# Patient Record
Sex: FEMALE | Race: WHITE
[De-identification: names, ages, dates, MRNs, and addresses within clinical notes are randomized per-mention and may not be internally consistent; named-entity substitution may affect disease eponyms.]

---

## 2017-02-21 ENCOUNTER — HOSPITAL ENCOUNTER (OUTPATIENT)
Age: 72
Discharge: HOME | End: 2017-02-21
Payer: MEDICARE

## 2017-02-21 DIAGNOSIS — I10: Primary | ICD-10-CM

## 2017-02-21 DIAGNOSIS — E78.5: ICD-10-CM

## 2017-02-21 DIAGNOSIS — E11.9: ICD-10-CM

## 2017-03-22 ENCOUNTER — HOSPITAL ENCOUNTER (OUTPATIENT)
Age: 72
Discharge: HOME | End: 2017-03-22
Payer: MEDICARE

## 2017-03-22 DIAGNOSIS — Z12.31: Primary | ICD-10-CM

## 2017-07-21 ENCOUNTER — HOSPITAL ENCOUNTER (OUTPATIENT)
Dept: HOSPITAL 76 - LAB.WCP | Age: 72
Discharge: HOME | End: 2017-07-21
Attending: FAMILY MEDICINE
Payer: MEDICARE

## 2017-07-21 DIAGNOSIS — E11.9: Primary | ICD-10-CM

## 2017-07-21 DIAGNOSIS — I10: ICD-10-CM

## 2017-07-21 LAB
ANION GAP SERPL CALCULATED.4IONS-SCNC: 7 MMOL/L (ref 6–13)
BUN SERPL-MCNC: 16 MG/DL (ref 6–20)
CALCIUM UR-MCNC: 9.5 MG/DL (ref 8.5–10.3)
CHLORIDE SERPL-SCNC: 97 MMOL/L (ref 101–111)
CO2 SERPL-SCNC: 31 MMOL/L (ref 21–32)
CREAT SERPLBLD-SCNC: 0.8 MG/DL (ref 0.4–1)
EST. AVERAGE GLUCOSE BLD GHB EST-MCNC: 148 MG/DL (ref 70–100)
GFRSERPLBLD MDRD-ARVRAT: 71 ML/MIN/{1.73_M2} (ref 89–?)
GLUCOSE SERPL-MCNC: 118 MG/DL (ref 70–100)
HBA1C BLD-MCNC: 0.71 G/DL
POTASSIUM SERPL-SCNC: 3.8 MMOL/L (ref 3.5–5)
SODIUM SERPLBLD-SCNC: 135 MMOL/L (ref 135–145)

## 2017-07-21 PROCEDURE — 80048 BASIC METABOLIC PNL TOTAL CA: CPT

## 2017-07-21 PROCEDURE — 36415 COLL VENOUS BLD VENIPUNCTURE: CPT

## 2017-07-21 PROCEDURE — 83036 HEMOGLOBIN GLYCOSYLATED A1C: CPT

## 2018-03-29 ENCOUNTER — HOSPITAL ENCOUNTER (OUTPATIENT)
Dept: HOSPITAL 76 - LAB.WCP | Age: 73
Discharge: HOME | End: 2018-03-29
Attending: FAMILY MEDICINE
Payer: MEDICARE

## 2018-03-29 DIAGNOSIS — E11.9: Primary | ICD-10-CM

## 2018-03-29 LAB
EST. AVERAGE GLUCOSE BLD GHB EST-MCNC: 177 MG/DL (ref 70–100)
HB2 TOTAL: 13.2 G/DL
HBA1C BLD-MCNC: 0.82 G/DL
HEMOGLOBIN A1C %: 7.8 % (ref 4.6–6.2)

## 2018-03-29 PROCEDURE — 83036 HEMOGLOBIN GLYCOSYLATED A1C: CPT

## 2018-03-29 PROCEDURE — 36415 COLL VENOUS BLD VENIPUNCTURE: CPT

## 2018-04-19 ENCOUNTER — HOSPITAL ENCOUNTER (OUTPATIENT)
Dept: HOSPITAL 76 - DI.N | Age: 73
Discharge: HOME | End: 2018-04-19
Attending: FAMILY MEDICINE
Payer: MEDICARE

## 2018-04-19 DIAGNOSIS — Z12.31: Primary | ICD-10-CM

## 2018-04-19 PROCEDURE — 77067 SCR MAMMO BI INCL CAD: CPT

## 2018-04-20 NOTE — MAMMOGRAPHY REPORT
SCREENING MAMMOGRAM:  04/20/2018

 

CLINICAL INDICATION:  A 72-year-old for screening.

 

COMPARISON:  3/2017, 2/2016, 1/2015, 12/2013, 8/2012, 7/2011, 6/2010

 

TECHNIQUE:  Routine CC and MLO projections were obtained of the breasts.

 

FINDINGS:  The breasts again demonstrate scattered fibroglandular densities bilaterally.  

Coarse and punctate, typically benign calcifications are present, no suspicious masses, 

clustered microcalcifications, or regions of architectural distortion are identified.

 

IMPRESSION:  BENIGN FINDINGS.

 

RECOMMENDATION:  Routine annual screening unless otherwise clinically indicated.

 

BIRADS CATEGORY - 2 BENIGN FINDINGS.

 

STANDARD QUALIFYING STATEMENTS:

 

1.  This examination was reviewed with the aid of Computer-Aided Detection (CAD).

 

2.  A negative or benign imaging report should not delay biopsy if clinically suspicious 

findings are present.  Consider surgical consultation if warranted.  More than 5% of cancers 

are not identified by imaging.

 

3.  Dense breasts may obscure an underlying neoplasm.

 

 

 

DD: 04/20/2018 13:49

TD: 04/20/2018 20:52

Job #: [id number]

## 2018-06-28 ENCOUNTER — HOSPITAL ENCOUNTER (OUTPATIENT)
Dept: HOSPITAL 76 - LAB.WCP | Age: 73
Discharge: HOME | End: 2018-06-28
Attending: FAMILY MEDICINE
Payer: MEDICARE

## 2018-06-28 DIAGNOSIS — Z79.4: Primary | ICD-10-CM

## 2018-06-28 DIAGNOSIS — E78.5: ICD-10-CM

## 2018-06-28 DIAGNOSIS — I10: ICD-10-CM

## 2018-06-28 DIAGNOSIS — E11.9: ICD-10-CM

## 2018-06-28 DIAGNOSIS — R74.8: ICD-10-CM

## 2018-06-28 LAB
ALBUMIN DIAFP-MCNC: 4.1 G/DL (ref 3.2–5.5)
ALBUMIN/GLOB SERPL: 1.5 {RATIO} (ref 1–2.2)
ALP SERPL-CCNC: 71 IU/L (ref 42–121)
ALT SERPL W P-5'-P-CCNC: 28 IU/L (ref 10–60)
ANION GAP SERPL CALCULATED.4IONS-SCNC: 8 MMOL/L (ref 6–13)
AST SERPL W P-5'-P-CCNC: 25 IU/L (ref 10–42)
BILIRUB BLD-MCNC: 0.8 MG/DL (ref 0.2–1)
BUN SERPL-MCNC: 14 MG/DL (ref 6–20)
CALCIUM UR-MCNC: 9.5 MG/DL (ref 8.5–10.3)
CHLORIDE SERPL-SCNC: 98 MMOL/L (ref 101–111)
CHOLEST SERPL-MCNC: 173 MG/DL
CO2 SERPL-SCNC: 31 MMOL/L (ref 21–32)
CREAT SERPLBLD-SCNC: 0.8 MG/DL (ref 0.4–1)
EST. AVERAGE GLUCOSE BLD GHB EST-MCNC: 163 MG/DL (ref 70–100)
GFRSERPLBLD MDRD-ARVRAT: 71 ML/MIN/{1.73_M2} (ref 89–?)
GLOBULIN SER-MCNC: 2.7 G/DL (ref 2.1–4.2)
GLUCOSE SERPL-MCNC: 86 MG/DL (ref 70–100)
HB2 TOTAL: 13.8 G/DL
HBA1C BLD-MCNC: 0.78 G/DL
HDLC SERPL-MCNC: 72 MG/DL
HDLC SERPL: 2.4 {RATIO} (ref ?–4.4)
HEMOGLOBIN A1C %: 7.3 % (ref 4.6–6.2)
LDLC SERPL CALC-MCNC: 81 MG/DL
LDLC/HDLC SERPL: 1.1 {RATIO} (ref ?–4.4)
PROT SPEC-MCNC: 6.8 G/DL (ref 6.7–8.2)
SODIUM SERPLBLD-SCNC: 137 MMOL/L (ref 135–145)
VLDLC SERPL-SCNC: 20 MG/DL

## 2018-06-28 PROCEDURE — 82043 UR ALBUMIN QUANTITATIVE: CPT

## 2018-06-28 PROCEDURE — 83036 HEMOGLOBIN GLYCOSYLATED A1C: CPT

## 2018-06-28 PROCEDURE — 80053 COMPREHEN METABOLIC PANEL: CPT

## 2018-06-28 PROCEDURE — 80061 LIPID PANEL: CPT

## 2018-06-28 PROCEDURE — 36415 COLL VENOUS BLD VENIPUNCTURE: CPT

## 2018-06-28 PROCEDURE — 83721 ASSAY OF BLOOD LIPOPROTEIN: CPT

## 2019-03-21 ENCOUNTER — HOSPITAL ENCOUNTER (EMERGENCY)
Dept: HOSPITAL 76 - ED | Age: 74
Discharge: HOME | End: 2019-03-21
Payer: MEDICARE

## 2019-03-21 VITALS — SYSTOLIC BLOOD PRESSURE: 120 MMHG | DIASTOLIC BLOOD PRESSURE: 86 MMHG

## 2019-03-21 DIAGNOSIS — E11.9: ICD-10-CM

## 2019-03-21 DIAGNOSIS — L27.0: Primary | ICD-10-CM

## 2019-03-21 DIAGNOSIS — T49.0X5A: ICD-10-CM

## 2019-03-21 PROCEDURE — 99283 EMERGENCY DEPT VISIT LOW MDM: CPT

## 2019-03-21 NOTE — ED PHYSICIAN DOCUMENTATION
PD HPI SKIN





- Stated complaint


Stated Complaint: RASH





- Chief complaint


Chief Complaint: Wound





- History obtained from


History obtained from: Patient





- History of Present Illness


Timing - onset: How many days ago (3)


Timing - duration: Days (3)


Timing - details: Gradual onset, Still present


Location: Bodywide


Quality / character: Itchy, Discolored


Associated symptoms: Facial swelling.  No: Fever, Myalgias, Joint pain, 

Headache, Dyspnea, Abd pain, N/V/D, Urinary sx


Contributing factors: Exposed to medication


Similar symptoms before: Has not had sx before


Recently seen: Not recently seen





- Additional information


Additional information: 





Previously well 73-year-old diabetic female has developed overall body rash that

is urticarial and she believes this may be due to her use of terbinafine.  She 

is taking this medication for toenail fungus.  She has not had a reaction to 

medications previously with the exception of some prednisone causing an elevated

heart rate and blood sugar when she was using it long-term in her eyes after a 

rejected corneal transplant.  She denies any difficulty breathing and denies any

difficulty with swelling in her throat.





She did stop the terbinafine 2 days ago and she has been taking Allegra and 

Benadryl.





Review of Systems


Constitutional: denies: Fever


Eyes: denies: Decreased vision


Ears: denies: Ear pain


Nose: denies: Congestion


Throat: denies: Sore throat


Cardiac: denies: Chest pain / pressure, Palpitations


Respiratory: denies: Dyspnea, Cough


GI: denies: Abdominal Pain, Nausea, Vomiting


: denies: Dysuria, Frequency


Skin: reports: Rash


Musculoskeletal: denies: Neck pain, Back pain, Extremity pain


Neurologic: denies: Generalized weakness, Focal weakness, Numbness





PD PAST MEDICAL HISTORY





- Past Medical History


Past Medical History: Yes


Endocrine/Autoimmune: Type 2 diabetes


GI: Diverticulitis





- Past Surgical History


Past Surgical History: Yes





- Allergies


Allergies/Adverse Reactions: 


                                    Allergies











Allergy/AdvReac Type Severity Reaction Status Date / Time


 


prednisone Allergy  Anaphylaxis Verified 03/21/19 15:36














- Social History


Does the pt smoke?: No


Smoking Status: Never smoker


Does the pt drink ETOH?: No


Does the pt have substance abuse?: No





- Immunizations


Immunizations are current?: Yes





PD ED PE NORMAL





- Vitals


Vital signs reviewed: Yes (hypertensive mild )





- General


General: Alert and oriented X 3, No acute distress, Well developed/nourished





- HEENT


HEENT: Atraumatic, PERRL, EOMI





- Neck


Neck: Supple, no meningeal sign, No bony TTP





- Cardiac


Cardiac: RRR, No murmur





- Respiratory


Respiratory: No respiratory distress, Clear bilaterally





- Abdomen


Abdomen: Soft, Non tender





- Back


Back: No CVA TTP, No spinal TTP





- Derm


Derm: Normal color, Warm and dry, Other (There is erythema consistent with 

urticaria over the chest anteriorly, the rest of the body is similarly affected 

and there is swelling to periorbital tissues. )





- Extremities


Extremities: No deformity





- Neuro


Neuro: Alert and oriented X 3, CNs 2-12 intact, No motor deficit, No sensory 

deficit, Normal speech


Eye Opening: Spontaneous


Motor: Obeys Commands


Verbal: Oriented


GCS Score: 15





- Psych


Psych: Normal mood, Normal affect





Results





- Vitals


Vitals: 





                               Vital Signs - 24 hr











  03/21/19





  15:12


 


Temperature 36.8 C


 


Heart Rate 82


 


Respiratory 16





Rate 


 


Blood Pressure 119/88 H


 


O2 Saturation 100








                                     Oxygen











O2 Source                      Room air

















PD MEDICAL DECISION MAKING





- ED course


Complexity details: considered differential, d/w patient


ED course: 





73-year-old female with urticaria that looks like a drug eruption has been 

taking some terbinafine for the past month.  She will discontinue this as she 

did 2 days ago and she will continue the prednisone that her primary care doctor

put her on.  When I took the history from the patient I misunderstood that she 

was taking prednisone and we administered a dose of dexamethasone to the 

patient.  She only took the prednisone about an hour and a half before coming to

the emergency department and I do not believe is had enough time to help.





Departure





- Departure


Disposition: 01 Home, Self Care


Clinical Impression: 


 Allergic drug rash





Condition: Stable


Instructions:  ED Drug React Allergic


Follow-Up: 


Wai Estrada MD [Primary Care Provider] - 


Comments: 


Discontinue the terbinafine and take Benadryl 25-50 mg every 6 hours.  

Alternatively take the Allegra daily.  Continue the prednisone as prescribed by 

Dr. Estrada.  Expect to have some resolution over the next 24-48 hours.

## 2021-02-03 ENCOUNTER — HOSPITAL ENCOUNTER (OUTPATIENT)
Dept: HOSPITAL 76 - DI.N | Age: 76
Discharge: HOME | End: 2021-02-03
Attending: STUDENT IN AN ORGANIZED HEALTH CARE EDUCATION/TRAINING PROGRAM
Payer: MEDICARE

## 2021-02-03 DIAGNOSIS — Z12.31: Primary | ICD-10-CM

## 2021-02-03 NOTE — MAMMOGRAPHY REPORT
BILATERAL DIGITAL SCREENING MAMMOGRAM 3D/2D: 2/3/2021

 

CLINICAL: Routine screening.  

 

Comparison is made to exams dated:  4/19/2018 mammogram, 3/22/2017 mammogram, 2/8/2016 mammogram, 1/2
1/2015 mammogram, 12/31/2013 mammogram, and 8/24/2012 mammogram - [facility].  The 
tissue of both breasts is heterogeneously dense. This may lower the sensitivity of mammography.  

 

No significant masses, calcifications, or other findings are seen in either breast.  

There has been no significant interval change.

 

IMPRESSION: NEGATIVE

There is no mammographic evidence of malignancy. A 1 year screening mammogram is recommended.  

 

 

This exam was interpreted at Station ID: [id number].  

 

NOTE: For mammograms, a report in lay terms will be sent to the patient. Approximately 15% of breast 
malignancies will not be visualized mammographically. In the management of a palpable breast mass, a 
negative mammogram must not discourage biopsy of a clinically suspicious lesion.

 

Electronically Signed By: Dillon small/renée:2/3/2021 10:57:29  

 

 

 

ACR BI-RADS Category 1: Negative 3341F

PARENCHYMAL PATTERN: (D) - The breast(s) demonstrate(s) heterogeneously dense fibroglandular nathaniel rousseau.

BI-RADS CATEGORY: (1) - 1

RECOMMENDATION: (ANNUAL)  - Recommend routine annual screening mammography.

[id number]

1 year screening

LATERALITY: (B)

## 2022-11-01 ENCOUNTER — HOSPITAL ENCOUNTER (OUTPATIENT)
Dept: HOSPITAL 76 - DI.N | Age: 77
Discharge: HOME | End: 2022-11-01
Attending: STUDENT IN AN ORGANIZED HEALTH CARE EDUCATION/TRAINING PROGRAM
Payer: MEDICARE

## 2022-11-01 DIAGNOSIS — Z12.31: Primary | ICD-10-CM

## 2022-11-02 NOTE — MAMMOGRAPHY REPORT
BILATERAL DIGITAL SCREENING MAMMOGRAM 3D/2D: 11/1/2022

 

CLINICAL: Routine screening.  

 

Comparison is made to exams dated:  2/3/2021 mammogram, 4/19/2018 mammogram, and 3/22/2017 mammogram 
- [facility].  

 

Both breasts are heterogeneously dense, which may obscure small masses (category c / 51-75% glandular
 tissue).  

 

No significant masses, calcifications, or other findings are seen in either breast.  

There has been no significant interval change.

 

IMPRESSION: NEGATIVE

There is no mammographic evidence of malignancy. A 1 year screening mammogram is recommended.  

 

Based on the Tyrer Cuzick model (a risk assessment model) the patients lifetime risk is 6.4% and her
 10 year risk is 0.0%. According to the ACR, ACS, and NCCN guidelines, an annual breast MRI exam david
g with mammogram is recommended if the patients lifetime risk is 20% or greater.

 

 

This exam was interpreted at Station ID: [id number].  

 

NOTE: For mammograms, a report in lay terms will be sent to the patient. Approximately 15% of breast 
malignancies will not be visualized mammographically. In the management of a palpable breast mass, a 
negative mammogram must not discourage biopsy of a clinically suspicious lesion.

 

Electronically Signed By: Dillon small/renée:11/1/2022 14:09:07  

 

 

 

ACR BI-RADS Category 1: Negative 3341F

PARENCHYMAL PATTERN: (D) - The breast(s) demonstrate(s) heterogeneously dense fibroglandular nathaniel rousseau.

BI-RADS CATEGORY: (1) - 1

RECOMMENDATION: (ANNUAL)  - Recommend routine annual screening mammography.

[id number]

1 year screening

LATERALITY: (B)

## 2022-12-07 ENCOUNTER — HOSPITAL ENCOUNTER (OUTPATIENT)
Dept: HOSPITAL 76 - DI | Age: 77
Discharge: HOME | End: 2022-12-07
Attending: STUDENT IN AN ORGANIZED HEALTH CARE EDUCATION/TRAINING PROGRAM
Payer: MEDICARE

## 2022-12-07 DIAGNOSIS — N95.8: ICD-10-CM

## 2022-12-07 DIAGNOSIS — M85.89: ICD-10-CM

## 2022-12-07 DIAGNOSIS — Z13.820: Primary | ICD-10-CM

## 2022-12-07 NOTE — DEXA REPORT
PROCEDURE: Dexa Spine and/or Hip

 

INDICATIONS: POST MENOPAUSAL

 

TECHNIQUE: Dual energy x-ray absorptiometry (DXA) was performed on a [employer] System.  Regions measur
ed are the AP Spine, femoral neck, and if needed forearm.

 

COMPARISON: None.

  

FINDINGS:

 

Lumbar Spine: 

Bone Mineral Density   1.060 g/cm/cm,T score  -1.2, osteopenia

 

Left Femoral Neck:

Bone Mineral Density  0.73 g/cm/cm, T score -1.8, osteopenia

 

Left total Hip:

Bone Mineral Density  0.779 g/cm/cm,T score -1.8, osteopenia

 

(T score greater or equal to -1.0: NORMAL)

(T score from -1.1 to -2.4: OSTEOPENIA)

(T score less than or equal to -2.5 to: OSTEOPOROSIS)

 

IMPRESSION: Osteopenic bone mineral density.

 

Patients with diagnosis of osteoporosis or osteopenia should have regular bone mineral density assess
ment.  For those eligible for Medicare, routine testing is allowed once every 2 years.  Testing frequ
ency can be increased for patients who have rapidly progressing disease or for those who are receivin
g medical therapy to restore bone mass.

 

Reviewed by: Trent Reynolds on 12/7/2022 4:01 PM PST

Approved by: Trent Reynolds on 12/7/2022 4:01 PM PST

 

 

Station ID:  [id number]